# Patient Record
Sex: FEMALE | Race: WHITE | NOT HISPANIC OR LATINO | Employment: OTHER | URBAN - METROPOLITAN AREA
[De-identification: names, ages, dates, MRNs, and addresses within clinical notes are randomized per-mention and may not be internally consistent; named-entity substitution may affect disease eponyms.]

---

## 2017-10-20 ENCOUNTER — ESTABLISHED PATIENT (OUTPATIENT)
Dept: URBAN - METROPOLITAN AREA CLINIC 39 | Facility: CLINIC | Age: 57
End: 2017-10-20

## 2017-10-20 VITALS
DIASTOLIC BLOOD PRESSURE: 69 MMHG | HEART RATE: 72 BPM | SYSTOLIC BLOOD PRESSURE: 126 MMHG | HEIGHT: 55 IN | RESPIRATION RATE: 14 BRPM

## 2017-10-20 DIAGNOSIS — H04.123: ICD-10-CM

## 2017-10-20 PROCEDURE — 92015 DETERMINE REFRACTIVE STATE: CPT

## 2017-10-20 PROCEDURE — 92014 COMPRE OPH EXAM EST PT 1/>: CPT

## 2017-10-20 ASSESSMENT — VISUAL ACUITY
OS_SC: J12
OD_BAT: 20/80
OS_SC: 20/25
OS_BAT: 20/60
OS_CC: J2
OD_CC: J3
OD_SC: J12
OD_SC: 20/40

## 2017-10-20 ASSESSMENT — TONOMETRY
OD_IOP_MMHG: 17
OS_IOP_MMHG: 17

## 2017-11-03 ENCOUNTER — ESTABLISHED COMPREHENSIVE EXAM (OUTPATIENT)
Dept: URBAN - METROPOLITAN AREA CLINIC 39 | Facility: CLINIC | Age: 57
End: 2017-11-03

## 2017-11-03 VITALS
DIASTOLIC BLOOD PRESSURE: 69 MMHG | RESPIRATION RATE: 14 BRPM | HEIGHT: 55 IN | HEART RATE: 60 BPM | SYSTOLIC BLOOD PRESSURE: 126 MMHG

## 2017-11-03 DIAGNOSIS — H25.812: ICD-10-CM

## 2017-11-03 DIAGNOSIS — H25.811: ICD-10-CM

## 2017-11-03 PROCEDURE — 92136TC INTERFEROMETRY - TECHNICAL COMPONENT

## 2017-11-03 PROCEDURE — 92025-2 CORNEAL TOPOGRAPHY, PT

## 2017-11-03 PROCEDURE — 92014 COMPRE OPH EXAM EST PT 1/>: CPT

## 2017-11-03 ASSESSMENT — TONOMETRY
OS_IOP_MMHG: 17
OD_IOP_MMHG: 17

## 2017-11-03 ASSESSMENT — VISUAL ACUITY
OD_BAT: 20/80 WITH MR
OS_BAT: 20/60 WITH MR
OD_SC: 20/40-1
OS_SC: 20/20-1
OS_SC: J8

## 2018-01-11 ENCOUNTER — SURGERY/PROCEDURE (OUTPATIENT)
Dept: URBAN - METROPOLITAN AREA CLINIC 39 | Facility: CLINIC | Age: 58
End: 2018-01-11

## 2018-01-11 DIAGNOSIS — H25.811: ICD-10-CM

## 2018-01-11 PROCEDURE — 66984AV REMOVE CATARACT, INSERT ADVANCED LENS

## 2018-01-11 PROCEDURE — 65772LRI LRI DURING CAT SX

## 2018-01-11 PROCEDURE — 66999LNSR LENSAR LASER FOR CAT SX

## 2018-01-12 ENCOUNTER — POST-OP (OUTPATIENT)
Dept: URBAN - METROPOLITAN AREA CLINIC 39 | Facility: CLINIC | Age: 58
End: 2018-01-12

## 2018-01-12 DIAGNOSIS — Z96.1: ICD-10-CM

## 2018-01-12 PROCEDURE — 99024 POSTOP FOLLOW-UP VISIT: CPT

## 2018-01-12 ASSESSMENT — TONOMETRY: OD_IOP_MMHG: 14

## 2018-01-12 ASSESSMENT — VISUAL ACUITY
OD_SC: J3
OD_SC: 20/25-2

## 2018-01-18 ENCOUNTER — SURGERY/PROCEDURE (OUTPATIENT)
Dept: URBAN - METROPOLITAN AREA CLINIC 39 | Facility: CLINIC | Age: 58
End: 2018-01-18

## 2018-01-18 ENCOUNTER — POST OP/EVAL OF SECOND EYE (OUTPATIENT)
Dept: URBAN - METROPOLITAN AREA CLINIC 39 | Facility: CLINIC | Age: 58
End: 2018-01-18

## 2018-01-18 DIAGNOSIS — H25.812: ICD-10-CM

## 2018-01-18 DIAGNOSIS — Z96.1: ICD-10-CM

## 2018-01-18 PROCEDURE — 65772LRI LRI DURING CAT SX

## 2018-01-18 PROCEDURE — 66999LNSR LENSAR LASER FOR CAT SX

## 2018-01-18 PROCEDURE — 99024 POSTOP FOLLOW-UP VISIT: CPT

## 2018-01-18 PROCEDURE — 99213 OFFICE O/P EST LOW 20 MIN: CPT

## 2018-01-18 PROCEDURE — 66984AV REMOVE CATARACT, INSERT ADVANCED LENS

## 2018-01-18 ASSESSMENT — TONOMETRY
OS_IOP_MMHG: 18
OD_IOP_MMHG: 24

## 2018-01-18 ASSESSMENT — VISUAL ACUITY
OS_SC: J8
OS_SC: 20/20-1
OS_BAT: 20/60
OD_SC: J4
OD_SC: 20/25-1

## 2018-01-19 ENCOUNTER — CATARACT POST-OP 1-DAY (OUTPATIENT)
Dept: URBAN - METROPOLITAN AREA CLINIC 39 | Facility: CLINIC | Age: 58
End: 2018-01-19

## 2018-01-19 DIAGNOSIS — Z96.1: ICD-10-CM

## 2018-01-19 PROCEDURE — 99024 POSTOP FOLLOW-UP VISIT: CPT

## 2018-01-19 ASSESSMENT — VISUAL ACUITY
OS_SC: 20/20
OS_SC: J12
OD_SC: J3
OD_SC: 20/25+2

## 2018-01-19 ASSESSMENT — TONOMETRY
OD_IOP_MMHG: 15
OS_IOP_MMHG: 15

## 2018-01-22 ENCOUNTER — EST. PATIENT EMERGENCY (OUTPATIENT)
Dept: URBAN - METROPOLITAN AREA CLINIC 35 | Facility: CLINIC | Age: 58
End: 2018-01-22

## 2018-01-22 DIAGNOSIS — Z96.1: ICD-10-CM

## 2018-01-22 PROCEDURE — 99024 POSTOP FOLLOW-UP VISIT: CPT

## 2018-01-22 ASSESSMENT — VISUAL ACUITY
OS_SC: 20/25
OD_SC: 20/25+2

## 2018-01-22 ASSESSMENT — TONOMETRY
OD_IOP_MMHG: 15
OS_IOP_MMHG: 15

## 2018-02-09 ENCOUNTER — POST-OP CATARACT (OUTPATIENT)
Dept: URBAN - METROPOLITAN AREA CLINIC 39 | Facility: CLINIC | Age: 58
End: 2018-02-09

## 2018-02-09 DIAGNOSIS — Z96.1: ICD-10-CM

## 2018-02-09 PROCEDURE — 99024 POSTOP FOLLOW-UP VISIT: CPT

## 2018-02-09 ASSESSMENT — VISUAL ACUITY
OU_SC: J2-1
OD_SC: 20/20-2
OS_SC: J2-1
OD_SC: J4
OU_SC: 20/20
OS_SC: 20/25-2

## 2018-02-09 ASSESSMENT — TONOMETRY
OD_IOP_MMHG: 15
OS_IOP_MMHG: 16

## 2018-02-16 ENCOUNTER — POST-OP CATARACT (OUTPATIENT)
Dept: URBAN - METROPOLITAN AREA CLINIC 39 | Facility: CLINIC | Age: 58
End: 2018-02-16

## 2018-02-16 DIAGNOSIS — H10.33: ICD-10-CM

## 2018-02-16 DIAGNOSIS — Z96.1: ICD-10-CM

## 2018-02-16 PROCEDURE — 99213 OFFICE O/P EST LOW 20 MIN: CPT

## 2018-02-16 PROCEDURE — 99024 POSTOP FOLLOW-UP VISIT: CPT

## 2018-02-16 RX ORDER — TOBRAMYCIN AND DEXAMETHASONE 1; 3 MG/ML; MG/ML: 1 SUSPENSION/ DROPS OPHTHALMIC

## 2018-02-16 ASSESSMENT — VISUAL ACUITY
OS_SC: 20/25
OD_SC: 20/20-1
OS_SC: J2
OU_SC: 20/20
OU_SC: J2

## 2018-02-16 ASSESSMENT — TONOMETRY
OD_IOP_MMHG: 12
OS_IOP_MMHG: 12

## 2018-03-15 ENCOUNTER — EST. PATIENT EMERGENCY (OUTPATIENT)
Dept: URBAN - METROPOLITAN AREA CLINIC 39 | Facility: CLINIC | Age: 58
End: 2018-03-15

## 2018-03-15 DIAGNOSIS — Z96.1: ICD-10-CM

## 2018-03-15 DIAGNOSIS — H20.023: ICD-10-CM

## 2018-03-15 PROCEDURE — 99024 POSTOP FOLLOW-UP VISIT: CPT

## 2018-03-15 RX ORDER — PREDNISOLONE ACETATE 10 MG/ML: 1 SUSPENSION/ DROPS OPHTHALMIC

## 2018-03-15 ASSESSMENT — VISUAL ACUITY
OD_SC: 20/20
OS_SC: J2
OD_SC: J2
OS_SC: 20/25

## 2018-03-15 ASSESSMENT — TONOMETRY
OS_IOP_MMHG: 15
OD_IOP_MMHG: 15

## 2018-04-04 ENCOUNTER — EST. PATIENT EMERGENCY (OUTPATIENT)
Dept: URBAN - METROPOLITAN AREA CLINIC 35 | Facility: CLINIC | Age: 58
End: 2018-04-04

## 2018-04-04 DIAGNOSIS — Z96.1: ICD-10-CM

## 2018-04-04 DIAGNOSIS — H20.023: ICD-10-CM

## 2018-04-04 PROCEDURE — G8427 DOCREV CUR MEDS BY ELIG CLIN: HCPCS

## 2018-04-04 PROCEDURE — 4040F PNEUMOC VAC/ADMIN/RCVD: CPT

## 2018-04-04 PROCEDURE — 99024 POSTOP FOLLOW-UP VISIT: CPT

## 2018-04-04 PROCEDURE — 99211 OFF/OP EST MAY X REQ PHY/QHP: CPT

## 2018-04-04 PROCEDURE — G8785 BP SCRN NO PERF AT INTERVAL: HCPCS

## 2018-04-04 PROCEDURE — 1036F TOBACCO NON-USER: CPT

## 2018-04-04 PROCEDURE — G8482 FLU IMMUNIZE ORDER/ADMIN: HCPCS

## 2018-04-04 ASSESSMENT — TONOMETRY
OS_IOP_MMHG: 15
OD_IOP_MMHG: 15
OS_IOP_MMHG: 17
OD_IOP_MMHG: 17

## 2018-04-04 ASSESSMENT — VISUAL ACUITY
OS_SC: 20/25
OD_SC: J2
OD_SC: J2
OD_SC: 20/20
OS_SC: J2
OS_SC: 20/25
OD_SC: 20/20
OS_SC: J2

## 2018-05-02 ENCOUNTER — ESTABLISHED PATIENT (OUTPATIENT)
Dept: URBAN - METROPOLITAN AREA CLINIC 35 | Facility: CLINIC | Age: 58
End: 2018-05-02

## 2018-05-02 DIAGNOSIS — Z96.1: ICD-10-CM

## 2018-05-02 DIAGNOSIS — H20.023: ICD-10-CM

## 2018-05-02 PROCEDURE — 99213 OFFICE O/P EST LOW 20 MIN: CPT

## 2018-05-02 RX ORDER — CYCLOSPORINE 0.5 MG/ML: 1 EMULSION OPHTHALMIC TWICE A DAY

## 2018-05-02 ASSESSMENT — TONOMETRY
OD_IOP_MMHG: 12
OS_IOP_MMHG: 12

## 2018-05-02 ASSESSMENT — VISUAL ACUITY
OD_SC: 20/25
OS_SC: 20/30

## 2018-05-23 ENCOUNTER — FOLLOW UP (OUTPATIENT)
Dept: URBAN - METROPOLITAN AREA CLINIC 35 | Facility: CLINIC | Age: 58
End: 2018-05-23

## 2018-05-23 DIAGNOSIS — H01.006: ICD-10-CM

## 2018-05-23 DIAGNOSIS — H01.003: ICD-10-CM

## 2018-05-23 DIAGNOSIS — H20.023: ICD-10-CM

## 2018-05-23 PROCEDURE — 92012 INTRM OPH EXAM EST PATIENT: CPT

## 2018-05-23 ASSESSMENT — VISUAL ACUITY
OS_SC: 20/25
OD_SC: 20/25+2

## 2018-05-23 ASSESSMENT — TONOMETRY
OD_IOP_MMHG: 13
OS_IOP_MMHG: 13

## 2018-06-20 ENCOUNTER — ESTABLISHED PATIENT (OUTPATIENT)
Dept: URBAN - METROPOLITAN AREA CLINIC 35 | Facility: CLINIC | Age: 58
End: 2018-06-20

## 2018-06-20 VITALS
DIASTOLIC BLOOD PRESSURE: 86 MMHG | HEART RATE: 83 BPM | RESPIRATION RATE: 14 BRPM | HEIGHT: 55 IN | SYSTOLIC BLOOD PRESSURE: 138 MMHG

## 2018-06-20 DIAGNOSIS — H11.33: ICD-10-CM

## 2018-06-20 DIAGNOSIS — H01.003: ICD-10-CM

## 2018-06-20 DIAGNOSIS — H01.006: ICD-10-CM

## 2018-06-20 PROCEDURE — 92012 INTRM OPH EXAM EST PATIENT: CPT

## 2018-06-20 RX ORDER — CYCLOSPORINE 0.5 MG/ML: 1 EMULSION OPHTHALMIC TWICE A DAY

## 2018-06-20 ASSESSMENT — VISUAL ACUITY
OS_SC: J3+
OD_SC: 20/25-2
OD_SC: J3
OS_SC: 20/30-2

## 2018-06-20 ASSESSMENT — TONOMETRY
OS_IOP_MMHG: 11
OD_IOP_MMHG: 11

## 2018-07-18 ENCOUNTER — FOLLOW UP (OUTPATIENT)
Dept: URBAN - METROPOLITAN AREA CLINIC 35 | Facility: CLINIC | Age: 58
End: 2018-07-18

## 2018-07-18 DIAGNOSIS — H01.003: ICD-10-CM

## 2018-07-18 DIAGNOSIS — H26.492: ICD-10-CM

## 2018-07-18 DIAGNOSIS — H26.491: ICD-10-CM

## 2018-07-18 DIAGNOSIS — H11.33: ICD-10-CM

## 2018-07-18 DIAGNOSIS — H01.006: ICD-10-CM

## 2018-07-18 PROCEDURE — 92012 INTRM OPH EXAM EST PATIENT: CPT

## 2018-07-18 RX ORDER — LOTEPREDNOL ETABONATE 2 MG/ML: 1 SUSPENSION/ DROPS OPHTHALMIC AS NEEDED

## 2018-07-18 RX ORDER — BEPOTASTINE BESILATE 15 MG/ML: 1 SOLUTION/ DROPS OPHTHALMIC TWICE A DAY

## 2018-07-18 ASSESSMENT — VISUAL ACUITY
OS_SC: 20/30-1
OD_SC: 20/25-1

## 2018-07-18 ASSESSMENT — TONOMETRY
OD_IOP_MMHG: 9
OS_IOP_MMHG: 9

## 2018-09-17 ENCOUNTER — FOLLOW UP (OUTPATIENT)
Dept: URBAN - METROPOLITAN AREA CLINIC 35 | Facility: CLINIC | Age: 58
End: 2018-09-17

## 2018-09-17 DIAGNOSIS — H01.003: ICD-10-CM

## 2018-09-17 DIAGNOSIS — H26.492: ICD-10-CM

## 2018-09-17 DIAGNOSIS — H26.491: ICD-10-CM

## 2018-09-17 DIAGNOSIS — H01.006: ICD-10-CM

## 2018-09-17 PROCEDURE — 92012 INTRM OPH EXAM EST PATIENT: CPT

## 2018-09-17 RX ORDER — LOTEPREDNOL ETABONATE 2 MG/ML: 1 SUSPENSION/ DROPS OPHTHALMIC TWICE A DAY

## 2018-09-17 ASSESSMENT — VISUAL ACUITY
OD_BAT: 20/50 WITH MR
OS_SC: J3
OD_SC: 20/25
OS_SC: 20/30-1
OS_BAT: 20/50 WITH MR
OD_SC: J4

## 2018-09-17 ASSESSMENT — TONOMETRY
OS_IOP_MMHG: 16
OD_IOP_MMHG: 12

## 2018-10-04 ENCOUNTER — SURGERY/PROCEDURE (OUTPATIENT)
Dept: URBAN - METROPOLITAN AREA SURGERY 14 | Facility: SURGERY | Age: 58
End: 2018-10-04

## 2018-10-04 DIAGNOSIS — H26.492: ICD-10-CM

## 2018-10-04 PROCEDURE — 66821 AFTER CATARACT LASER SURGERY: CPT

## 2018-10-10 ASSESSMENT — VISUAL ACUITY
OD_BAT: 20/50 WITH MR
OD_SC: 20/25

## 2018-10-11 ENCOUNTER — SURGERY/PROCEDURE (OUTPATIENT)
Dept: URBAN - METROPOLITAN AREA SURGERY 14 | Facility: SURGERY | Age: 58
End: 2018-10-11

## 2018-10-11 ENCOUNTER — PREPPED CHART (OUTPATIENT)
Dept: URBAN - METROPOLITAN AREA CLINIC 39 | Facility: CLINIC | Age: 58
End: 2018-10-11

## 2018-10-11 DIAGNOSIS — Z98.890: ICD-10-CM

## 2018-10-11 DIAGNOSIS — H26.491: ICD-10-CM

## 2018-10-11 PROCEDURE — 4040F PNEUMOC VAC/ADMIN/RCVD: CPT

## 2018-10-11 PROCEDURE — G9903 PT SCRN TBCO ID AS NON USER: HCPCS

## 2018-10-11 PROCEDURE — G8427 DOCREV CUR MEDS BY ELIG CLIN: HCPCS

## 2018-10-11 PROCEDURE — G8482 FLU IMMUNIZE ORDER/ADMIN: HCPCS

## 2018-10-11 PROCEDURE — 66821 AFTER CATARACT LASER SURGERY: CPT

## 2018-10-11 PROCEDURE — 99213 OFFICE O/P EST LOW 20 MIN: CPT

## 2018-10-11 PROCEDURE — G8785 BP SCRN NO PERF AT INTERVAL: HCPCS

## 2018-10-11 PROCEDURE — 1036F TOBACCO NON-USER: CPT

## 2018-10-19 ENCOUNTER — YAG POST-OP (OUTPATIENT)
Dept: URBAN - METROPOLITAN AREA CLINIC 39 | Facility: CLINIC | Age: 58
End: 2018-10-19

## 2018-10-19 DIAGNOSIS — Z98.890: ICD-10-CM

## 2018-10-19 PROCEDURE — 99024 POSTOP FOLLOW-UP VISIT: CPT

## 2018-10-19 ASSESSMENT — VISUAL ACUITY
OS_SC: 20/25
OD_SC: 20/20
OS_SC: J2
OD_SC: J2

## 2018-10-19 ASSESSMENT — TONOMETRY
OD_IOP_MMHG: 14
OS_IOP_MMHG: 14

## 2018-11-30 ENCOUNTER — REFRACTION ONLY (OUTPATIENT)
Dept: URBAN - METROPOLITAN AREA CLINIC 39 | Facility: CLINIC | Age: 58
End: 2018-11-30

## 2018-11-30 DIAGNOSIS — Z98.890: ICD-10-CM

## 2018-11-30 PROCEDURE — 92015GRNC REFRACTION GLASSES RECHECK - NO CHARGE

## 2018-11-30 ASSESSMENT — VISUAL ACUITY
OU_SC: J2 BLURRY
OD_SC: J8
OS_SC: 20/30
OU_SC: 20/20-2
OS_SC: J4
OD_SC: 20/25-1

## 2020-01-17 ENCOUNTER — ESTABLISHED COMPREHENSIVE EXAM (OUTPATIENT)
Dept: URBAN - METROPOLITAN AREA CLINIC 39 | Facility: CLINIC | Age: 60
End: 2020-01-17

## 2020-01-17 DIAGNOSIS — Z98.890: ICD-10-CM

## 2020-01-17 DIAGNOSIS — H16.223: ICD-10-CM

## 2020-01-17 DIAGNOSIS — H01.02A: ICD-10-CM

## 2020-01-17 DIAGNOSIS — H43.813: ICD-10-CM

## 2020-01-17 DIAGNOSIS — H01.02B: ICD-10-CM

## 2020-01-17 DIAGNOSIS — Z96.1: ICD-10-CM

## 2020-01-17 DIAGNOSIS — H20.023: ICD-10-CM

## 2020-01-17 PROCEDURE — 92015 DETERMINE REFRACTIVE STATE: CPT

## 2020-01-17 PROCEDURE — 92014 COMPRE OPH EXAM EST PT 1/>: CPT

## 2020-01-17 ASSESSMENT — VISUAL ACUITY
OD_SC: 20/30-1
OD_SC: J3
OS_SC: J2
OS_SC: 20/30+2

## 2020-01-17 ASSESSMENT — TONOMETRY
OD_IOP_MMHG: 13
OS_IOP_MMHG: 14

## 2020-11-23 ENCOUNTER — EST. PATIENT EMERGENCY (OUTPATIENT)
Dept: URBAN - METROPOLITAN AREA CLINIC 39 | Facility: CLINIC | Age: 60
End: 2020-11-23

## 2020-11-23 DIAGNOSIS — Z96.1: ICD-10-CM

## 2020-11-23 DIAGNOSIS — H16.223: ICD-10-CM

## 2020-11-23 DIAGNOSIS — H11.32: ICD-10-CM

## 2020-11-23 PROCEDURE — 92012 INTRM OPH EXAM EST PATIENT: CPT

## 2020-11-23 RX ORDER — MINERAL OIL 2; 2 MG/.4ML; MG/.4ML: 1 EMULSION OPHTHALMIC

## 2020-11-23 ASSESSMENT — TONOMETRY
OD_IOP_MMHG: 15
OS_IOP_MMHG: 14

## 2020-11-23 ASSESSMENT — VISUAL ACUITY
OU_CC: 20/20-2
OS_CC: 20/25-2
OD_CC: 20/25

## 2020-12-17 ENCOUNTER — EST. PATIENT EMERGENCY (OUTPATIENT)
Dept: URBAN - METROPOLITAN AREA CLINIC 39 | Facility: CLINIC | Age: 60
End: 2020-12-17

## 2020-12-17 DIAGNOSIS — H10.013: ICD-10-CM

## 2020-12-17 DIAGNOSIS — Z96.1: ICD-10-CM

## 2020-12-17 DIAGNOSIS — H16.223: ICD-10-CM

## 2020-12-17 PROCEDURE — 92012 INTRM OPH EXAM EST PATIENT: CPT

## 2020-12-17 RX ORDER — TOBRAMYCIN AND DEXAMETHASONE 1; 3 MG/ML; MG/ML: 1 SUSPENSION/ DROPS OPHTHALMIC

## 2020-12-17 ASSESSMENT — TONOMETRY
OD_IOP_MMHG: 15
OS_IOP_MMHG: 14

## 2020-12-17 ASSESSMENT — VISUAL ACUITY
OU_SC: 20/25+1
OS_SC: 20/30-1
OD_SC: 20/25

## 2021-01-15 ENCOUNTER — ESTABLISHED COMPREHENSIVE EXAM (OUTPATIENT)
Dept: URBAN - METROPOLITAN AREA CLINIC 39 | Facility: CLINIC | Age: 61
End: 2021-01-15

## 2021-01-15 DIAGNOSIS — H20.023: ICD-10-CM

## 2021-01-15 DIAGNOSIS — H16.223: ICD-10-CM

## 2021-01-15 DIAGNOSIS — H11.32: ICD-10-CM

## 2021-01-15 DIAGNOSIS — Z96.1: ICD-10-CM

## 2021-01-15 DIAGNOSIS — H43.813: ICD-10-CM

## 2021-01-15 PROCEDURE — 92014 COMPRE OPH EXAM EST PT 1/>: CPT

## 2021-01-15 PROCEDURE — 92015 DETERMINE REFRACTIVE STATE: CPT

## 2021-01-15 RX ORDER — LOTEPREDNOL ETABONATE 5 MG/ML: 1 SUSPENSION/ DROPS OPHTHALMIC

## 2021-01-15 RX ORDER — MINERAL OIL 2; 2 MG/.4ML; MG/.4ML: 1 EMULSION OPHTHALMIC

## 2021-01-15 ASSESSMENT — VISUAL ACUITY
OS_CC: J1
OD_CC: J1
OD_SC: 20/25+2
OD_SC: J3
OS_SC: J3
OS_SC: 20/20-1

## 2021-01-15 ASSESSMENT — TONOMETRY
OD_IOP_MMHG: 15
OS_IOP_MMHG: 14

## 2021-02-12 ENCOUNTER — FOLLOW UP (OUTPATIENT)
Dept: URBAN - METROPOLITAN AREA CLINIC 39 | Facility: CLINIC | Age: 61
End: 2021-02-12

## 2021-02-12 DIAGNOSIS — H16.223: ICD-10-CM

## 2021-02-12 DIAGNOSIS — H10.45: ICD-10-CM

## 2021-02-12 PROCEDURE — A4262 TEMPORARY TEAR DUCT PLUG: HCPCS

## 2021-02-12 PROCEDURE — 68761Q PUNCTAL PLUG/QUINTESS DISSOLVABLE PLUG/EACH

## 2021-02-12 PROCEDURE — 92012 INTRM OPH EXAM EST PATIENT: CPT

## 2021-02-12 ASSESSMENT — VISUAL ACUITY
OS_SC: J1
OS_CC: J1
OD_SC: J3
OD_SC: 20/20-2
OD_CC: J1
OS_SC: 20/40

## 2021-02-12 ASSESSMENT — TONOMETRY
OD_IOP_MMHG: 16
OS_IOP_MMHG: 16

## 2021-04-30 ENCOUNTER — FOLLOW UP (OUTPATIENT)
Dept: URBAN - METROPOLITAN AREA CLINIC 39 | Facility: CLINIC | Age: 61
End: 2021-04-30

## 2021-04-30 DIAGNOSIS — H10.45: ICD-10-CM

## 2021-04-30 DIAGNOSIS — H01.02B: ICD-10-CM

## 2021-04-30 DIAGNOSIS — H01.02A: ICD-10-CM

## 2021-04-30 DIAGNOSIS — H16.223: ICD-10-CM

## 2021-04-30 PROCEDURE — 92012 INTRM OPH EXAM EST PATIENT: CPT

## 2021-04-30 RX ORDER — CYCLOSPORINE 0 MG/ML: 1 SOLUTION/ DROPS OPHTHALMIC; TOPICAL TWICE A DAY

## 2021-04-30 ASSESSMENT — VISUAL ACUITY
OS_SC: 20/25-2
OD_SC: 20/25+2

## 2021-04-30 ASSESSMENT — TONOMETRY
OS_IOP_MMHG: 18
OD_IOP_MMHG: 16

## 2021-06-15 ENCOUNTER — FOLLOW UP (OUTPATIENT)
Dept: URBAN - METROPOLITAN AREA CLINIC 38 | Facility: CLINIC | Age: 61
End: 2021-06-15

## 2021-06-15 DIAGNOSIS — H01.02A: ICD-10-CM

## 2021-06-15 DIAGNOSIS — H16.223: ICD-10-CM

## 2021-06-15 DIAGNOSIS — H10.45: ICD-10-CM

## 2021-06-15 DIAGNOSIS — H20.023: ICD-10-CM

## 2021-06-15 DIAGNOSIS — H01.02B: ICD-10-CM

## 2021-06-15 PROCEDURE — 92012 INTRM OPH EXAM EST PATIENT: CPT

## 2021-06-15 RX ORDER — PREDNISOLONE ACETATE 10 MG/ML: 1 SUSPENSION/ DROPS OPHTHALMIC

## 2021-06-15 ASSESSMENT — VISUAL ACUITY
OU_SC: 20/20
OD_SC: 20/20
OS_SC: 20/20-2

## 2021-06-15 ASSESSMENT — TONOMETRY
OS_IOP_MMHG: 16
OD_IOP_MMHG: 16

## 2021-06-30 ENCOUNTER — FOLLOW UP (OUTPATIENT)
Dept: URBAN - METROPOLITAN AREA CLINIC 38 | Facility: CLINIC | Age: 61
End: 2021-06-30

## 2021-06-30 DIAGNOSIS — H16.223: ICD-10-CM

## 2021-06-30 DIAGNOSIS — H10.45: ICD-10-CM

## 2021-06-30 PROCEDURE — 92012 INTRM OPH EXAM EST PATIENT: CPT

## 2021-06-30 ASSESSMENT — VISUAL ACUITY
OD_SC: 20/20
OS_SC: 20/25

## 2021-06-30 ASSESSMENT — TONOMETRY
OS_IOP_MMHG: 18
OD_IOP_MMHG: 18

## 2021-08-11 ENCOUNTER — FOLLOW UP (OUTPATIENT)
Dept: URBAN - METROPOLITAN AREA CLINIC 38 | Facility: CLINIC | Age: 61
End: 2021-08-11

## 2021-08-11 DIAGNOSIS — H20.023: ICD-10-CM

## 2021-08-11 DIAGNOSIS — H01.02B: ICD-10-CM

## 2021-08-11 DIAGNOSIS — H16.223: ICD-10-CM

## 2021-08-11 DIAGNOSIS — H10.45: ICD-10-CM

## 2021-08-11 DIAGNOSIS — H01.02A: ICD-10-CM

## 2021-08-11 PROCEDURE — 92012 INTRM OPH EXAM EST PATIENT: CPT

## 2021-08-11 RX ORDER — LOTEPREDNOL ETABONATE 5 MG/ML: 1 SUSPENSION/ DROPS OPHTHALMIC AS DIRECTED

## 2021-08-11 RX ORDER — LIFITEGRAST 50 MG/ML: 1 SOLUTION/ DROPS OPHTHALMIC TWICE A DAY

## 2021-08-11 ASSESSMENT — TONOMETRY
OD_IOP_MMHG: 15
OS_IOP_MMHG: 15

## 2021-08-11 ASSESSMENT — VISUAL ACUITY
OS_SC: 20/20-1
OU_SC: 20/20-1
OD_SC: 20/20-1

## 2021-08-24 NOTE — PATIENT DISCUSSION
- Follow up in 1 year for [facility] SERVICES [facility]

## 2021-09-22 ENCOUNTER — FOLLOW UP (OUTPATIENT)
Dept: URBAN - METROPOLITAN AREA CLINIC 38 | Facility: CLINIC | Age: 61
End: 2021-09-22

## 2021-09-22 DIAGNOSIS — H01.02B: ICD-10-CM

## 2021-09-22 DIAGNOSIS — H01.02A: ICD-10-CM

## 2021-09-22 DIAGNOSIS — H10.45: ICD-10-CM

## 2021-09-22 DIAGNOSIS — H16.223: ICD-10-CM

## 2021-09-22 PROCEDURE — 92012 INTRM OPH EXAM EST PATIENT: CPT

## 2021-09-22 RX ORDER — PREDNISOLONE ACETATE 10 MG/ML: 1 SUSPENSION/ DROPS OPHTHALMIC

## 2021-09-22 ASSESSMENT — TONOMETRY
OS_IOP_MMHG: 17
OD_IOP_MMHG: 15

## 2021-09-22 ASSESSMENT — VISUAL ACUITY
OS_SC: 20/20-1
OD_SC: 20/20-1

## 2021-09-23 ENCOUNTER — CORNEA CONSULT (OUTPATIENT)
Dept: URBAN - METROPOLITAN AREA CLINIC 39 | Facility: CLINIC | Age: 61
End: 2021-09-23

## 2021-09-23 DIAGNOSIS — H01.02B: ICD-10-CM

## 2021-09-23 DIAGNOSIS — H11.9: ICD-10-CM

## 2021-09-23 DIAGNOSIS — H02.883: ICD-10-CM

## 2021-09-23 DIAGNOSIS — H01.02A: ICD-10-CM

## 2021-09-23 DIAGNOSIS — H35.372: ICD-10-CM

## 2021-09-23 DIAGNOSIS — H16.223: ICD-10-CM

## 2021-09-23 DIAGNOSIS — H02.886: ICD-10-CM

## 2021-09-23 PROCEDURE — 92250 FUNDUS PHOTOGRAPHY W/I&R: CPT

## 2021-09-23 PROCEDURE — 92134 CPTRZ OPH DX IMG PST SGM RTA: CPT

## 2021-09-23 PROCEDURE — 92025 CPTRIZED CORNEAL TOPOGRAPHY: CPT

## 2021-09-23 PROCEDURE — 99203 OFFICE O/P NEW LOW 30 MIN: CPT

## 2021-09-23 RX ORDER — CYCLOSPORINE 0.5 MG/ML: 1 EMULSION OPHTHALMIC TWICE A DAY

## 2021-09-23 ASSESSMENT — VISUAL ACUITY
OD_SC: J6
OD_CC: J1
OS_CC: J1
OS_SC: J3
OS_SC: 20/30-2
OD_SC: 20/25-2

## 2021-09-23 ASSESSMENT — TONOMETRY
OS_IOP_MMHG: 13
OD_IOP_MMHG: 14

## 2021-11-15 ENCOUNTER — CLINIC PROCEDURE ONLY (OUTPATIENT)
Dept: URBAN - METROPOLITAN AREA CLINIC 43 | Facility: CLINIC | Age: 61
End: 2021-11-15

## 2021-11-15 DIAGNOSIS — H02.883: ICD-10-CM

## 2021-11-15 DIAGNOSIS — H02.886: ICD-10-CM

## 2021-11-15 PROCEDURE — 67999I ILUX- MGD

## 2022-01-05 ENCOUNTER — PRE-OP/H&P (OUTPATIENT)
Dept: URBAN - METROPOLITAN AREA SURGERY 14 | Facility: SURGERY | Age: 62
End: 2022-01-05

## 2022-01-05 ENCOUNTER — SURGERY/PROCEDURE (OUTPATIENT)
Dept: URBAN - METROPOLITAN AREA CLINIC 43 | Facility: CLINIC | Age: 62
End: 2022-01-05

## 2022-01-05 DIAGNOSIS — H01.02B: ICD-10-CM

## 2022-01-05 DIAGNOSIS — H11.9: ICD-10-CM

## 2022-01-05 DIAGNOSIS — H16.223: ICD-10-CM

## 2022-01-05 DIAGNOSIS — H02.886: ICD-10-CM

## 2022-01-05 DIAGNOSIS — H35.372: ICD-10-CM

## 2022-01-05 DIAGNOSIS — H02.883: ICD-10-CM

## 2022-01-05 DIAGNOSIS — H01.02A: ICD-10-CM

## 2022-01-05 PROCEDURE — 68320 REVISE/GRAFT EYELID LINING: CPT

## 2022-01-05 PROCEDURE — 99211HP H&P OFFICE/OUTPATIENT VISIT, EST

## 2022-01-06 ENCOUNTER — POST-OP (OUTPATIENT)
Dept: URBAN - METROPOLITAN AREA CLINIC 39 | Facility: CLINIC | Age: 62
End: 2022-01-06

## 2022-01-06 DIAGNOSIS — H02.886: ICD-10-CM

## 2022-01-06 DIAGNOSIS — H01.02A: ICD-10-CM

## 2022-01-06 DIAGNOSIS — H02.883: ICD-10-CM

## 2022-01-06 DIAGNOSIS — H40.053: ICD-10-CM

## 2022-01-06 DIAGNOSIS — H01.02B: ICD-10-CM

## 2022-01-06 DIAGNOSIS — H11.9: ICD-10-CM

## 2022-01-06 DIAGNOSIS — H16.223: ICD-10-CM

## 2022-01-06 DIAGNOSIS — H35.372: ICD-10-CM

## 2022-01-06 PROCEDURE — 99024 POSTOP FOLLOW-UP VISIT: CPT

## 2022-01-06 RX ORDER — TOBRAMYCIN AND DEXAMETHASONE 3; 1 MG/G; MG/G: OINTMENT OPHTHALMIC

## 2022-01-06 RX ORDER — BRIMONIDINE TARTRATE, TIMOLOL MALEATE 2; 5 MG/ML; MG/ML: 1 SOLUTION/ DROPS OPHTHALMIC EVERY 12 HOURS

## 2022-01-06 ASSESSMENT — VISUAL ACUITY
OS_SC: 20/60-1
OD_SC: 20/25-2
OD_SC: J6
OU_SC: 20/20-2
OU_SC: J2
OS_SC: J3

## 2022-01-06 ASSESSMENT — TONOMETRY
OD_IOP_MMHG: 31
OS_IOP_MMHG: 35

## 2022-01-10 ENCOUNTER — POST-OP (OUTPATIENT)
Dept: URBAN - METROPOLITAN AREA CLINIC 39 | Facility: CLINIC | Age: 62
End: 2022-01-10

## 2022-01-10 DIAGNOSIS — H11.9: ICD-10-CM

## 2022-01-10 PROCEDURE — 99024 POSTOP FOLLOW-UP VISIT: CPT

## 2022-01-10 ASSESSMENT — TONOMETRY
OS_IOP_MMHG: 12
OD_IOP_MMHG: 10
OD_IOP_MMHG: 12

## 2022-01-10 ASSESSMENT — VISUAL ACUITY
OS_SC: 20/30
OD_SC: 20/20

## 2022-02-07 ENCOUNTER — POST-OP (OUTPATIENT)
Dept: URBAN - METROPOLITAN AREA CLINIC 39 | Facility: CLINIC | Age: 62
End: 2022-02-07

## 2022-02-07 DIAGNOSIS — H11.9: ICD-10-CM

## 2022-02-07 PROCEDURE — 99024 POSTOP FOLLOW-UP VISIT: CPT

## 2022-02-07 ASSESSMENT — TONOMETRY
OS_IOP_MMHG: 15
OD_IOP_MMHG: 16

## 2022-02-07 ASSESSMENT — VISUAL ACUITY
OS_SC: 20/30-1
OD_SC: 20/25+2

## 2022-03-22 ENCOUNTER — POST-OP (OUTPATIENT)
Dept: URBAN - METROPOLITAN AREA CLINIC 39 | Facility: CLINIC | Age: 62
End: 2022-03-22

## 2022-03-22 DIAGNOSIS — H11.9: ICD-10-CM

## 2022-03-22 PROCEDURE — 99024 POSTOP FOLLOW-UP VISIT: CPT

## 2022-03-22 RX ORDER — LOTEPREDNOL ETABONATE 5 MG/ML: 1 SUSPENSION/ DROPS OPHTHALMIC AS DIRECTED

## 2022-03-22 ASSESSMENT — TONOMETRY
OS_IOP_MMHG: 16
OD_IOP_MMHG: 16

## 2022-03-22 ASSESSMENT — VISUAL ACUITY
OS_SC: 20/30-2
OD_SC: J3
OU_SC: J2
OU_SC: 20/20
OD_SC: 20/20-1
OS_SC: J2

## 2022-04-29 ENCOUNTER — FOLLOW UP (OUTPATIENT)
Dept: URBAN - METROPOLITAN AREA CLINIC 39 | Facility: CLINIC | Age: 62
End: 2022-04-29

## 2022-04-29 DIAGNOSIS — H01.02B: ICD-10-CM

## 2022-04-29 DIAGNOSIS — H02.88A: ICD-10-CM

## 2022-04-29 DIAGNOSIS — H01.02A: ICD-10-CM

## 2022-04-29 DIAGNOSIS — H02.88B: ICD-10-CM

## 2022-04-29 DIAGNOSIS — H16.223: ICD-10-CM

## 2022-04-29 DIAGNOSIS — H11.9: ICD-10-CM

## 2022-04-29 PROCEDURE — 68761Q PUNCTAL PLUG/QUINTESS DISSOLVABLE PLUG/EACH

## 2022-04-29 PROCEDURE — A4262 TEMPORARY TEAR DUCT PLUG: HCPCS

## 2022-04-29 PROCEDURE — 92012 INTRM OPH EXAM EST PATIENT: CPT

## 2022-04-29 ASSESSMENT — TONOMETRY
OS_IOP_MMHG: 14
OD_IOP_MMHG: 14

## 2022-04-29 ASSESSMENT — VISUAL ACUITY
OD_SC: 20/25+2
OS_SC: 20/40+2

## 2022-10-28 ENCOUNTER — COMPREHENSIVE EXAM (OUTPATIENT)
Dept: URBAN - METROPOLITAN AREA CLINIC 39 | Facility: CLINIC | Age: 62
End: 2022-10-28

## 2022-10-28 DIAGNOSIS — H01.02A: ICD-10-CM

## 2022-10-28 DIAGNOSIS — H01.02B: ICD-10-CM

## 2022-10-28 DIAGNOSIS — Z98.890: ICD-10-CM

## 2022-10-28 DIAGNOSIS — H16.223: ICD-10-CM

## 2022-10-28 DIAGNOSIS — H02.88A: ICD-10-CM

## 2022-10-28 DIAGNOSIS — H20.023: ICD-10-CM

## 2022-10-28 DIAGNOSIS — H11.9: ICD-10-CM

## 2022-10-28 DIAGNOSIS — H02.88B: ICD-10-CM

## 2022-10-28 DIAGNOSIS — H43.813: ICD-10-CM

## 2022-10-28 DIAGNOSIS — Z96.1: ICD-10-CM

## 2022-10-28 DIAGNOSIS — H35.372: ICD-10-CM

## 2022-10-28 PROCEDURE — 92015 DETERMINE REFRACTIVE STATE: CPT

## 2022-10-28 PROCEDURE — A4262 TEMPORARY TEAR DUCT PLUG: HCPCS

## 2022-10-28 PROCEDURE — 92134 CPTRZ OPH DX IMG PST SGM RTA: CPT

## 2022-10-28 PROCEDURE — 68761Q PUNCTAL PLUG/QUINTESS DISSOLVABLE PLUG/EACH

## 2022-10-28 PROCEDURE — 92014 COMPRE OPH EXAM EST PT 1/>: CPT

## 2022-10-28 ASSESSMENT — VISUAL ACUITY
OD_SC: 20/20-1
OU_SC: 20/20-1
OS_CC: J1+
OS_SC: 20/30-2
OD_CC: J1+
OU_CC: J1+

## 2022-10-28 ASSESSMENT — TONOMETRY
OS_IOP_MMHG: 19
OD_IOP_MMHG: 19

## 2023-04-28 ENCOUNTER — FOLLOW UP (OUTPATIENT)
Dept: URBAN - METROPOLITAN AREA CLINIC 39 | Facility: CLINIC | Age: 63
End: 2023-04-28

## 2023-04-28 DIAGNOSIS — H02.88B: ICD-10-CM

## 2023-04-28 DIAGNOSIS — H16.223: ICD-10-CM

## 2023-04-28 DIAGNOSIS — H01.02B: ICD-10-CM

## 2023-04-28 DIAGNOSIS — H02.88A: ICD-10-CM

## 2023-04-28 DIAGNOSIS — H01.02A: ICD-10-CM

## 2023-04-28 PROCEDURE — A4262 TEMPORARY TEAR DUCT PLUG: HCPCS

## 2023-04-28 PROCEDURE — 68761Q PUNCTAL PLUG/QUINTESS DISSOLVABLE PLUG/EACH

## 2023-04-28 PROCEDURE — 92012 INTRM OPH EXAM EST PATIENT: CPT

## 2023-04-28 ASSESSMENT — VISUAL ACUITY
OU_SC: 20/20
OS_SC: 20/30-1
OD_SC: 20/20-2

## 2023-04-28 ASSESSMENT — TONOMETRY
OD_IOP_MMHG: 18
OS_IOP_MMHG: 16

## 2024-02-29 ENCOUNTER — COMPREHENSIVE EXAM (OUTPATIENT)
Dept: URBAN - METROPOLITAN AREA CLINIC 39 | Facility: CLINIC | Age: 64
End: 2024-02-29

## 2024-02-29 DIAGNOSIS — H35.372: ICD-10-CM

## 2024-02-29 DIAGNOSIS — H02.88B: ICD-10-CM

## 2024-02-29 DIAGNOSIS — Z96.1: ICD-10-CM

## 2024-02-29 DIAGNOSIS — H01.02A: ICD-10-CM

## 2024-02-29 DIAGNOSIS — H16.223: ICD-10-CM

## 2024-02-29 DIAGNOSIS — H02.88A: ICD-10-CM

## 2024-02-29 DIAGNOSIS — H11.9: ICD-10-CM

## 2024-02-29 DIAGNOSIS — H01.02B: ICD-10-CM

## 2024-02-29 DIAGNOSIS — H20.023: ICD-10-CM

## 2024-02-29 DIAGNOSIS — H43.813: ICD-10-CM

## 2024-02-29 PROCEDURE — A4262 TEMPORARY TEAR DUCT PLUG: HCPCS

## 2024-02-29 PROCEDURE — 92014 COMPRE OPH EXAM EST PT 1/>: CPT

## 2024-02-29 PROCEDURE — 92134 CPTRZ OPH DX IMG PST SGM RTA: CPT

## 2024-02-29 PROCEDURE — 68761Q PUNCTAL PLUG/QUINTESS DISSOLVABLE PLUG/EACH

## 2024-02-29 RX ORDER — CYCLOSPORINE OPHTHALMIC SOLUTION 1 MG/ML: 1 SOLUTION/ DROPS OPHTHALMIC TWICE A DAY

## 2024-02-29 ASSESSMENT — VISUAL ACUITY
OS_SC: 20/20
OU_SC: J1
OD_SC: 20/20-1
OD_SC: J5
OU_SC: 20/20
OS_SC: J1

## 2024-02-29 ASSESSMENT — TONOMETRY
OD_IOP_MMHG: 18
OS_IOP_MMHG: 18

## 2024-09-25 ENCOUNTER — FOLLOW UP (OUTPATIENT)
Dept: URBAN - METROPOLITAN AREA CLINIC 43 | Facility: CLINIC | Age: 64
End: 2024-09-25

## 2024-09-25 DIAGNOSIS — H16.223: ICD-10-CM

## 2024-09-25 DIAGNOSIS — H02.88A: ICD-10-CM

## 2024-09-25 DIAGNOSIS — H11.9: ICD-10-CM

## 2024-09-25 DIAGNOSIS — H02.88B: ICD-10-CM

## 2024-09-25 PROCEDURE — A4262 TEMPORARY TEAR DUCT PLUG: HCPCS | Mod: E2,E4

## 2024-09-25 PROCEDURE — 68761Q PUNCTAL PLUG/QUINTESS DISSOLVABLE PLUG/EACH

## 2024-09-25 PROCEDURE — 99213 OFFICE O/P EST LOW 20 MIN: CPT | Mod: 25

## 2025-03-24 ENCOUNTER — COMPREHENSIVE EXAM (OUTPATIENT)
Age: 65
End: 2025-03-24

## 2025-03-24 DIAGNOSIS — H35.372: ICD-10-CM

## 2025-03-24 DIAGNOSIS — H52.12: ICD-10-CM

## 2025-03-24 DIAGNOSIS — H01.02B: ICD-10-CM

## 2025-03-24 DIAGNOSIS — H02.88B: ICD-10-CM

## 2025-03-24 DIAGNOSIS — H02.88A: ICD-10-CM

## 2025-03-24 DIAGNOSIS — H01.02A: ICD-10-CM

## 2025-03-24 DIAGNOSIS — H16.223: ICD-10-CM

## 2025-03-24 PROCEDURE — 92015 DETERMINE REFRACTIVE STATE: CPT

## 2025-03-24 PROCEDURE — 92134 CPTRZ OPH DX IMG PST SGM RTA: CPT

## 2025-03-24 PROCEDURE — 92014 COMPRE OPH EXAM EST PT 1/>: CPT | Mod: 25

## 2025-03-24 RX ORDER — DOXYCYCLINE 50 MG/1: 1 CAPSULE ORAL ONCE A DAY

## 2025-04-03 ENCOUNTER — CLINIC PROCEDURE ONLY (OUTPATIENT)
Age: 65
End: 2025-04-03

## 2025-04-03 DIAGNOSIS — H16.223: ICD-10-CM

## 2025-04-03 PROCEDURE — 99199ST SERUM TEARS

## 2025-06-25 ENCOUNTER — CLINIC PROCEDURE ONLY (OUTPATIENT)
Age: 65
End: 2025-06-25

## 2025-06-25 DIAGNOSIS — H02.88A: ICD-10-CM

## 2025-06-25 DIAGNOSIS — H16.223: ICD-10-CM

## 2025-06-25 PROCEDURE — 92012 INTRM OPH EXAM EST PATIENT: CPT

## 2025-06-25 PROCEDURE — 99199ST SERUM TEARS
